# Patient Record
Sex: FEMALE | Race: BLACK OR AFRICAN AMERICAN | NOT HISPANIC OR LATINO
[De-identification: names, ages, dates, MRNs, and addresses within clinical notes are randomized per-mention and may not be internally consistent; named-entity substitution may affect disease eponyms.]

---

## 2023-06-08 PROBLEM — Z00.00 ENCOUNTER FOR PREVENTIVE HEALTH EXAMINATION: Status: ACTIVE | Noted: 2023-06-08

## 2023-06-20 ENCOUNTER — APPOINTMENT (OUTPATIENT)
Dept: SURGERY | Facility: CLINIC | Age: 33
End: 2023-06-20
Payer: MEDICARE

## 2023-06-20 ENCOUNTER — LABORATORY RESULT (OUTPATIENT)
Age: 33
End: 2023-06-20

## 2023-06-20 VITALS
DIASTOLIC BLOOD PRESSURE: 91 MMHG | SYSTOLIC BLOOD PRESSURE: 168 MMHG | HEART RATE: 109 BPM | BODY MASS INDEX: 50.02 KG/M2 | WEIGHT: 293 LBS | HEIGHT: 64 IN

## 2023-06-20 DIAGNOSIS — G47.30 SLEEP APNEA, UNSPECIFIED: ICD-10-CM

## 2023-06-20 DIAGNOSIS — M54.9 DORSALGIA, UNSPECIFIED: ICD-10-CM

## 2023-06-20 DIAGNOSIS — Z83.3 FAMILY HISTORY OF DIABETES MELLITUS: ICD-10-CM

## 2023-06-20 DIAGNOSIS — Z87.891 PERSONAL HISTORY OF NICOTINE DEPENDENCE: ICD-10-CM

## 2023-06-20 PROCEDURE — 99204 OFFICE O/P NEW MOD 45 MIN: CPT

## 2023-06-20 RX ORDER — IRON/IRON ASP GLY/FA/MV-MIN 38 125-25-1MG
TABLET ORAL
Refills: 0 | Status: ACTIVE | COMMUNITY

## 2023-06-20 RX ORDER — ARIPIPRAZOLE 9.75 MG/1.3ML
INJECTION, SOLUTION INTRAMUSCULAR
Refills: 0 | Status: ACTIVE | COMMUNITY

## 2023-06-22 ENCOUNTER — NON-APPOINTMENT (OUTPATIENT)
Age: 33
End: 2023-06-22

## 2023-06-22 LAB
25(OH)D3 SERPL-MCNC: 18.2 NG/ML
ALBUMIN SERPL ELPH-MCNC: 4 G/DL
ALP BLD-CCNC: 92 U/L
ALT SERPL-CCNC: 15 U/L
ANION GAP SERPL CALC-SCNC: 12 MMOL/L
APTT BLD: 26.9 SEC
AST SERPL-CCNC: 18 U/L
BILIRUB SERPL-MCNC: 0.5 MG/DL
BUN SERPL-MCNC: 15 MG/DL
CALCIUM SERPL-MCNC: 9.1 MG/DL
CALCIUM SERPL-MCNC: 9.1 MG/DL
CHLORIDE SERPL-SCNC: 101 MMOL/L
CHOLEST SERPL-MCNC: 133 MG/DL
CO2 SERPL-SCNC: 27 MMOL/L
CREAT SERPL-MCNC: 0.75 MG/DL
EGFR: 108 ML/MIN/1.73M2
ESTIMATED AVERAGE GLUCOSE: 103 MG/DL
FERRITIN SERPL-MCNC: 8 NG/ML
FOLATE SERPL-MCNC: >20 NG/ML
GLUCOSE SERPL-MCNC: 92 MG/DL
HBA1C MFR BLD HPLC: 5.2 %
HDLC SERPL-MCNC: 41 MG/DL
INR PPP: 1.07 RATIO
IRON SATN MFR SERPL: 43 %
IRON SERPL-MCNC: 186 UG/DL
LDLC SERPL CALC-MCNC: 66 MG/DL
NONHDLC SERPL-MCNC: 92 MG/DL
PARATHYROID HORMONE INTACT: 46 PG/ML
POTASSIUM SERPL-SCNC: 4.3 MMOL/L
PROT SERPL-MCNC: 7 G/DL
PT BLD: 12.6 SEC
SODIUM SERPL-SCNC: 140 MMOL/L
TIBC SERPL-MCNC: 436 UG/DL
TRIGL SERPL-MCNC: 127 MG/DL
TSH SERPL-ACNC: 2.08 UIU/ML
UIBC SERPL-MCNC: 250 UG/DL
VIT B12 SERPL-MCNC: 866 PG/ML

## 2023-06-22 NOTE — HISTORY OF PRESENT ILLNESS
[de-identified] : LORETTA CHAN is a 33 year old female who present in the office with morbid obesity (BMI  80.16 kg/m2)  for bariatric surgery consultation. Patient was referred by Dr Edward. The patient has tried multiple methods to lose weight in the past including diets, Calorie-counting, restricted intake, portion control and exercise without any long term success. The patient has been obese for many years.  Patient feels that weight loss surgery is the only option at this point for effective and clinically meaningful weight loss. Patient seek weight loss surgery for improvement of their activity level, health and comorbid conditions. Patient was advised on she should not get pregnant during this process and 2 years after the surgery to increase weigh loss goal. \par \par Her PMHX includes HTN, Bipolar disorder with last hospitalization in 2019, Sleep apnea( stated that CPAP was not approved by her insurance), Back pain. Patient denies Acid Reflux.  Her profession is disability.

## 2023-06-22 NOTE — CONSULT LETTER
[Dear  ___] : Dear  [unfilled], [Consult Letter:] : I had the pleasure of evaluating your patient, [unfilled]. [Consult Closing:] : Thank you very much for allowing me to participate in the care of this patient.  If you have any questions, please do not hesitate to contact me. [Sincerely,] : Sincerely, [DrDevan  ___] : Dr. JENKINS [___] : [unfilled] [FreeTextEntry1] : [place] [address], [facility]  [phone] [FreeTextEntry3] : Dr Lozano

## 2023-06-22 NOTE — ASSESSMENT
[FreeTextEntry1] : LORETTA CHAN is a 33 year old female with morbid obesity with BMI of 80.16 kg/m2 which places patient in the morbidly obese category. This has directly contributed to patient's current medical conditions as well as, a decreased quality of life. Patient has very little chance of successfully losing and maintaining a significant amount of weight with non-surgical management, and would benefit from surgical intervention. Patient meets the criteria for weight loss surgery as defined in the NIH consensus statement, surgery is medically necessary. \par \par Given patient’s current BMI and obesity-related comorbidities, Ms. CHAN  meets the NIH criteria for bariatric surgery and  I feel the patient is a candidate for and would benefit from weight loss surgery, as all prior attempts by non-surgical means have been futile. \par \par I have had a lengthy conversation with the patient and have discussed my impression and treatment plan with the patient. \par The risks, benefits and alternatives, including laparoscopic gastric bypass, and laparoscopic sleeve gastrectomy, ALONSO-S were discussed at length and all of his questions were answered. The patient appears to understand and wishes to proceed.\par \par The patient was given the following instructions:\par \par \par 1) Standard preoperative bariatric workup to include cardiology and pulmonology clearance, upper endoscopy with biopsy for H pylori, and preoperative fasting blood work.\par 2) Patient will need to meet with psychologist and nutritionist for preoperative counseling regarding dietary goals, lifestyle changes, and postoperative expectations\par 3) We will assess the need for a medically-supervised diet, if required by the patient's insurer\par 4) Patient must attend 1 information session with bariatric coordinator and team\par 5) Patient should call insurance to ensure coverage for bariatric surgery\par 6) Bariatric Coordinator \par 7) Follow up next month \par 8) Patient wishes to have her weigh ins done in our office. \par 9) Patient will be on anticoagulation post bariatric surgery for  DVT prophylaxis post surgery.

## 2023-06-22 NOTE — PHYSICAL EXAM
[Obese] : obese [Normal] : affect appropriate [de-identified] : Normoactive bowel sounds, soft and nontender, no hepatosplenomegaly or masses noted\par

## 2023-06-22 NOTE — REASON FOR VISIT
[Initial Consult] : an initial consult for [Morbid Obesity (BMI>40)] : morbid obesity (bmi>40) [FreeTextEntry2] : .

## 2023-06-22 NOTE — PLAN
[FreeTextEntry1] : Please follow up at the office in next month and as needed for any questions or concerns

## 2023-06-22 NOTE — REVIEW OF SYSTEMS
[SOB on Exertion] : shortness of breath during exertion [Negative] : Allergic/Immunologic [Shortness Of Breath] : no shortness of breath [Wheezing] : no wheezing [Cough] : no cough

## 2023-06-23 ENCOUNTER — APPOINTMENT (OUTPATIENT)
Dept: GASTROENTEROLOGY | Facility: CLINIC | Age: 33
End: 2023-06-23
Payer: MEDICARE

## 2023-06-23 VITALS
BODY MASS INDEX: 50.02 KG/M2 | DIASTOLIC BLOOD PRESSURE: 95 MMHG | HEART RATE: 95 BPM | OXYGEN SATURATION: 97 % | TEMPERATURE: 98.1 F | WEIGHT: 293 LBS | HEIGHT: 64 IN | SYSTOLIC BLOOD PRESSURE: 158 MMHG

## 2023-06-23 DIAGNOSIS — R10.13 EPIGASTRIC PAIN: ICD-10-CM

## 2023-06-23 PROCEDURE — 99203 OFFICE O/P NEW LOW 30 MIN: CPT

## 2023-06-23 RX ORDER — SIMETHICONE 125 MG/1
125 TABLET, CHEWABLE ORAL
Qty: 120 | Refills: 3 | Status: ACTIVE | COMMUNITY
Start: 2023-06-23 | End: 1900-01-01

## 2023-06-23 NOTE — HISTORY OF PRESENT ILLNESS
[FreeTextEntry1] : The patient is a 33-year-old female with past medical history significant for hypertension, and obstructive sleep apnea not on C-PAP hypothyroidism and anemia who was referred to my office by Dr. Edward ([phone] [facility]) for dyspepsia, atypical chest pain. The patient also came to the office for evaluation for bariatric surgery with Dr. López Lozano for possible gastric sleeve surgery.  I was asked to render an opinion for consultation for the above complaints.   The patient states that she is feeling fine.  The patient denies any abdominal pain.  The patient complains of abdominal gas and bloating.  The patient denies any nausea or vomiting.  The patient denies any gastroesophageal reflux disease or dysphagia. The patient complains of atypical chest pain, shortness of breath and palpitations.  The chest pain is described as a sharp, pressure, intermittent substernal discomfort that occasionally radiates to the back.  The patient denies any diaphoresis. The chest pain is described as being 6 out of 10 in intensity.  The chest pain can occur at any time.  The chest pain is worse at night.  The chest pain is worse with stress and improves with passing gas.  The chest pain is unrelated to meals.  The chest pain has awakened the patient from sleep.  The patient denies any constipation or diarrhea.  The patient has 1 to 2 bowel movements a day. The patient denies a change in bowel habits.  The patient denies a change in caliber of stool.   The patient denies having mucus discharge with the bowel movements.  The patient denies any bright red blood per rectum, melena or hematemesis.  The patient denies any rectal pain or rectal pruritus. The patient complains of fluctuating weight but denies any anorexia.  The patient weights 467 lbs and 5 foot 4 inches. She denies any fevers or chills.  The patient denies any jaundice or pruritus.  The patient complains of lower back pain.  The blood work performed on 2023 revealed a normal total cholesterol/triglyceride level of 133/127 mg/dL, respectively, and elevated serum iron level of 186 mcg/dL, and elevated TIBC of 436 mcg/dL, a normal U IBC of 250 mcg/dL, a normal iron percent saturation of 43%, normal liver enzymes with a total bilirubin of 0.5 mg/dL, a normal alkaline phosphatase/AST/ALT of 92/18/15 U/L, respectively, a normal intact parathyroid hormone of 46 pg/mL, a normal calcium level of 9.1 mg/dL, a normal vitamin B12 level of 866 pg/mL, a normal folate level >20.0 ng/mL, a low ferritin level of 8 ng/mL, a normal TSH of 2.08u IU/mL, a low vitamin D level of 18.2 ng/mL, a normal hemoglobin A1c of 5.2% with an estimated average glucose of 103 mg/dL, anemia with a hemoglobin/hematocrit level of 8.5/33.1, respectively, a low platelet count of 141,000, a normal PTT/INR of 12.6/1.07/, respectively, a low PTT of 26.9 seconds. The patient denies ever having a prior upper endoscopy and colonoscopy performed by another gastroenterologist.  The patient's last menstrual period was on May 29, 2023. The patient's periods are irregular.  The patient's menstrual periods are heavy for 2 to 3 weeks.  The patient is a .  The patient's first menstrual period was at age 12. The patient denies any significant family history of GI problems.  \par \par (+) prior smoking 2 to 5 cigarettes a day x 2 to 3 years, (-) ETOH, (-) IVDA\par 
No

## 2023-06-23 NOTE — REVIEW OF SYSTEMS
[Feeling Tired] : feeling tired [Chest Pain] : chest pain [Palpitations] : palpitations [SOB on Exertion] : shortness of breath during exertion [Bloating (gassiness)] : bloating [Anxiety] : anxiety [Depression] : depression [Easy Bruising] : a tendency for easy bruising [Negative] : Heme/Lymph [FreeTextEntry4] : ear infection [de-identified] : headaches

## 2023-06-23 NOTE — ASSESSMENT
[FreeTextEntry1] : Dyspepsia: The patient complains of dyspeptic symptoms.  The patient was advised to abide by an anti-gas (low FOD-MAP) diet.  The patient was given a pamphlet for anti-gas (low FOD-MAP).  The patient and I reviewed the anti-gas (low FOD-MAP) diet at length. The patient is to start on a trial of Simethicone one tablet 4 times a day p.r.n. abdominal pain and gas.\par Atypical Chest Pain: The patient complains of atypical chest pain of unclear etiology.  The patient was advised to follow up with the PMD and cardiologist regarding evaluation for the atypical chest pain. The patient was told of possible etiologies such as cardiac, pulmonary, GI, musculoskeletal, stress and other causes for the atypical chest pain.  The patient agrees and will follow-up with the PMD and cardiologist. \par Obesity: The patient is morbidly obese.  The patient was advised to lose weight and exercise.  The patient was advised to followup with a nutritionist regarding dieting for the weight loss. The patient cannot lose weight.  The patient is being evaluated with bariatric surgeon.   The patient is being followed for bariatric surgery for obesity with Dr. López Lozano for possible gastric sleeve surgery.  The patient agrees and will follow-up.  I recommend an upper endoscopy to assess for peptic ulcer disease versus esophagitis and any contraindications for bariatric surgery.  The patient was told of the risks and benefits of the procedure. The patient was told there was perforation, emergency surgery, bleeding, infections and missed lesions. The patient agreed and will schedule for the procedure. The patient was told to be n.p.o. after midnight. The patient is to return to the procedure.\par Upper Endoscopy: I recommend an upper endoscopy to assess for peptic ulcer disease versus esophagitis.  The patient was told of the risks and benefits of the procedure.     The patient is told not to drive, drink alcohol, use recreational drugs, exercise, or work the day of the procedure.  The patient was told of the need for an escort to accompany the patient home after the procedure. The patient is aware that the procedure may be cancelled if they fail to follow the directions.  The patient agreed and will schedule for the procedure. The patient is to be n.p.o. after midnight.  The patient is to return for the procedure.\par Follow-up: The patient is to follow-up in the office in 4 weeks to reassess the symptoms. The patient was told to call the office if any further problems. \par

## 2023-06-26 ENCOUNTER — APPOINTMENT (OUTPATIENT)
Dept: CARDIOLOGY | Facility: CLINIC | Age: 33
End: 2023-06-26
Payer: MEDICARE

## 2023-06-26 ENCOUNTER — NON-APPOINTMENT (OUTPATIENT)
Age: 33
End: 2023-06-26

## 2023-06-26 VITALS
BODY MASS INDEX: 50.02 KG/M2 | WEIGHT: 293 LBS | TEMPERATURE: 97.9 F | HEART RATE: 110 BPM | OXYGEN SATURATION: 93 % | HEIGHT: 64 IN

## 2023-06-26 DIAGNOSIS — I10 ESSENTIAL (PRIMARY) HYPERTENSION: ICD-10-CM

## 2023-06-26 DIAGNOSIS — R07.89 OTHER CHEST PAIN: ICD-10-CM

## 2023-06-26 DIAGNOSIS — R06.09 OTHER FORMS OF DYSPNEA: ICD-10-CM

## 2023-06-26 DIAGNOSIS — Z13.6 ENCOUNTER FOR SCREENING FOR CARDIOVASCULAR DISORDERS: ICD-10-CM

## 2023-06-26 LAB — VIT B1 SERPL-MCNC: 157.2 NMOL/L

## 2023-06-26 PROCEDURE — 99204 OFFICE O/P NEW MOD 45 MIN: CPT

## 2023-06-26 PROCEDURE — 93000 ELECTROCARDIOGRAM COMPLETE: CPT

## 2023-06-26 RX ORDER — LOSARTAN POTASSIUM 25 MG/1
25 TABLET, FILM COATED ORAL DAILY
Qty: 1 | Refills: 3 | Status: ACTIVE | COMMUNITY
Start: 2023-06-26 | End: 1900-01-01

## 2023-06-27 DIAGNOSIS — E55.9 VITAMIN D DEFICIENCY, UNSPECIFIED: ICD-10-CM

## 2023-06-27 RX ORDER — CHOLECALCIFEROL (VITAMIN D3) 1250 MCG
1.25 MG CAPSULE ORAL
Qty: 8 | Refills: 3 | Status: ACTIVE | COMMUNITY
Start: 2023-06-27 | End: 1900-01-01

## 2023-07-07 ENCOUNTER — OUTPATIENT (OUTPATIENT)
Dept: OUTPATIENT SERVICES | Facility: HOSPITAL | Age: 33
LOS: 1 days | End: 2023-07-07
Payer: MEDICARE

## 2023-07-07 DIAGNOSIS — R07.89 OTHER CHEST PAIN: ICD-10-CM

## 2023-07-07 PROCEDURE — 93306 TTE W/DOPPLER COMPLETE: CPT | Mod: 26

## 2023-07-07 PROCEDURE — 93306 TTE W/DOPPLER COMPLETE: CPT

## 2023-07-10 ENCOUNTER — APPOINTMENT (OUTPATIENT)
Dept: PULMONOLOGY | Facility: CLINIC | Age: 33
End: 2023-07-10
Payer: MEDICARE

## 2023-07-10 VITALS — OXYGEN SATURATION: 100 % | WEIGHT: 293 LBS | HEART RATE: 93 BPM | BODY MASS INDEX: 50.02 KG/M2 | HEIGHT: 64 IN

## 2023-07-10 DIAGNOSIS — Z98.84 BARIATRIC SURGERY STATUS: ICD-10-CM

## 2023-07-10 DIAGNOSIS — R06.83 SNORING: ICD-10-CM

## 2023-07-10 DIAGNOSIS — G47.19 OTHER HYPERSOMNIA: ICD-10-CM

## 2023-07-10 PROCEDURE — 99204 OFFICE O/P NEW MOD 45 MIN: CPT

## 2023-07-10 NOTE — REASON FOR VISIT
[Initial] : an initial visit [Shortness of Breath] : shortness of breath [Pre-op Risk Stratification] : pre-op risk stratification

## 2023-07-11 ENCOUNTER — NON-APPOINTMENT (OUTPATIENT)
Age: 33
End: 2023-07-11

## 2023-07-12 NOTE — ASSESSMENT
[FreeTextEntry1] : no clinical evidence of pulmonary pathology, good exercise tolerance. \par PFT and CXR pending\par \par Pt reports significant snoring and daytime sleepiness, and should undergo a diagnostic home sleep study to assess for any evidence of JAVIER. If found to be moderate or severe, would recommend 2-3 weeks of appropriate CPAP treatment prior to any surgical intervention.\par \par

## 2023-07-12 NOTE — CONSULT LETTER
[Dear  ___] : Dear  [unfilled], [Consult Letter:] : I had the pleasure of evaluating your patient, [unfilled]. [Please see my note below.] : Please see my note below. [Consult Closing:] : Thank you very much for allowing me to participate in the care of this patient.  If you have any questions, please do not hesitate to contact me. [Sincerely,] : Sincerely, [FreeTextEntry3] : Supriya Potter MD, FCCP\par Chief, Pulmonary Medicine\par [facility]\par [facility]\par  of Medicine\par [employer] [facility]\par \par

## 2023-07-12 NOTE — HISTORY OF PRESENT ILLNESS
[TextBox_4] : LORETTA CHAN  is a 33 year F who is here for preop evaluation for bariatric surgery. Pt reports no h/o respiratory illnesses, non smoker, without any occupational exposures. Pt reports good exercise tolerance on flat surface for about 3 blocks and some difficulty climbing stairs more than 3 flights. No cough, no chest pain or palpitations. \par Pt reports significant snoring, frequent night awakenings and daytime sleepiness with ESS >10.\par

## 2023-07-14 ENCOUNTER — OUTPATIENT (OUTPATIENT)
Dept: OUTPATIENT SERVICES | Facility: HOSPITAL | Age: 33
LOS: 1 days | End: 2023-07-14
Payer: MEDICARE

## 2023-07-14 ENCOUNTER — RESULT REVIEW (OUTPATIENT)
Age: 33
End: 2023-07-14

## 2023-07-14 ENCOUNTER — NON-APPOINTMENT (OUTPATIENT)
Age: 33
End: 2023-07-14

## 2023-07-14 VITALS
SYSTOLIC BLOOD PRESSURE: 173 MMHG | RESPIRATION RATE: 16 BRPM | WEIGHT: 293 LBS | TEMPERATURE: 98 F | OXYGEN SATURATION: 100 % | HEIGHT: 64 IN | HEART RATE: 97 BPM | DIASTOLIC BLOOD PRESSURE: 109 MMHG

## 2023-07-14 DIAGNOSIS — Z01.818 ENCOUNTER FOR OTHER PREPROCEDURAL EXAMINATION: ICD-10-CM

## 2023-07-14 DIAGNOSIS — Z98.890 OTHER SPECIFIED POSTPROCEDURAL STATES: Chronic | ICD-10-CM

## 2023-07-14 DIAGNOSIS — Z98.84 BARIATRIC SURGERY STATUS: ICD-10-CM

## 2023-07-14 LAB
ALBUMIN SERPL ELPH-MCNC: 3.4 G/DL — LOW (ref 3.5–5)
ALP SERPL-CCNC: 83 U/L — SIGNIFICANT CHANGE UP (ref 40–120)
ALT FLD-CCNC: 24 U/L DA — SIGNIFICANT CHANGE UP (ref 10–60)
ANION GAP SERPL CALC-SCNC: 6 MMOL/L — SIGNIFICANT CHANGE UP (ref 5–17)
ANISOCYTOSIS BLD QL: SLIGHT — SIGNIFICANT CHANGE UP
APTT BLD: 30.4 SEC — SIGNIFICANT CHANGE UP (ref 27.5–35.5)
AST SERPL-CCNC: 10 U/L — SIGNIFICANT CHANGE UP (ref 10–40)
BASOPHILS # BLD AUTO: 0.02 K/UL — SIGNIFICANT CHANGE UP (ref 0–0.2)
BASOPHILS NFR BLD AUTO: 0.3 % — SIGNIFICANT CHANGE UP (ref 0–2)
BILIRUB SERPL-MCNC: 0.4 MG/DL — SIGNIFICANT CHANGE UP (ref 0.2–1.2)
BLD GP AB SCN SERPL QL: SIGNIFICANT CHANGE UP
BUN SERPL-MCNC: 13 MG/DL — SIGNIFICANT CHANGE UP (ref 7–18)
CALCIUM SERPL-MCNC: 8.9 MG/DL — SIGNIFICANT CHANGE UP (ref 8.4–10.5)
CHLORIDE SERPL-SCNC: 104 MMOL/L — SIGNIFICANT CHANGE UP (ref 96–108)
CO2 SERPL-SCNC: 30 MMOL/L — SIGNIFICANT CHANGE UP (ref 22–31)
CREAT SERPL-MCNC: 0.69 MG/DL — SIGNIFICANT CHANGE UP (ref 0.5–1.3)
DACRYOCYTES BLD QL SMEAR: SLIGHT — SIGNIFICANT CHANGE UP
EGFR: 117 ML/MIN/1.73M2 — SIGNIFICANT CHANGE UP
EOSINOPHIL # BLD AUTO: 0.11 K/UL — SIGNIFICANT CHANGE UP (ref 0–0.5)
EOSINOPHIL NFR BLD AUTO: 1.6 % — SIGNIFICANT CHANGE UP (ref 0–6)
GLUCOSE SERPL-MCNC: 78 MG/DL — SIGNIFICANT CHANGE UP (ref 70–99)
HCG SERPL-ACNC: <1 MIU/ML — SIGNIFICANT CHANGE UP
HCT VFR BLD CALC: 35.8 % — SIGNIFICANT CHANGE UP (ref 34.5–45)
HGB BLD-MCNC: 9.8 G/DL — LOW (ref 11.5–15.5)
HYPOCHROMIA BLD QL: SIGNIFICANT CHANGE UP
IMM GRANULOCYTES NFR BLD AUTO: 0.3 % — SIGNIFICANT CHANGE UP (ref 0–0.9)
INR BLD: 1.11 RATIO — SIGNIFICANT CHANGE UP (ref 0.88–1.16)
LG PLATELETS BLD QL AUTO: SLIGHT — SIGNIFICANT CHANGE UP
LYMPHOCYTES # BLD AUTO: 2.07 K/UL — SIGNIFICANT CHANGE UP (ref 1–3.3)
LYMPHOCYTES # BLD AUTO: 29.2 % — SIGNIFICANT CHANGE UP (ref 13–44)
MANUAL SMEAR VERIFICATION: SIGNIFICANT CHANGE UP
MCHC RBC-ENTMCNC: 20.1 PG — LOW (ref 27–34)
MCHC RBC-ENTMCNC: 27.4 GM/DL — LOW (ref 32–36)
MCV RBC AUTO: 73.5 FL — LOW (ref 80–100)
MONOCYTES # BLD AUTO: 0.43 K/UL — SIGNIFICANT CHANGE UP (ref 0–0.9)
MONOCYTES NFR BLD AUTO: 6.1 % — SIGNIFICANT CHANGE UP (ref 2–14)
NEUTROPHILS # BLD AUTO: 4.44 K/UL — SIGNIFICANT CHANGE UP (ref 1.8–7.4)
NEUTROPHILS NFR BLD AUTO: 62.5 % — SIGNIFICANT CHANGE UP (ref 43–77)
NRBC # BLD: 0 /100 WBCS — SIGNIFICANT CHANGE UP (ref 0–0)
OVALOCYTES BLD QL SMEAR: SLIGHT — SIGNIFICANT CHANGE UP
PLAT MORPH BLD: NORMAL — SIGNIFICANT CHANGE UP
PLATELET # BLD AUTO: 187 K/UL — SIGNIFICANT CHANGE UP (ref 150–400)
PLATELET COUNT - ESTIMATE: NORMAL — SIGNIFICANT CHANGE UP
POIKILOCYTOSIS BLD QL AUTO: SLIGHT — SIGNIFICANT CHANGE UP
POTASSIUM SERPL-MCNC: 4.2 MMOL/L — SIGNIFICANT CHANGE UP (ref 3.5–5.3)
POTASSIUM SERPL-SCNC: 4.2 MMOL/L — SIGNIFICANT CHANGE UP (ref 3.5–5.3)
PROT SERPL-MCNC: 7.5 G/DL — SIGNIFICANT CHANGE UP (ref 6–8.3)
PROTHROM AB SERPL-ACNC: 13.2 SEC — SIGNIFICANT CHANGE UP (ref 10.5–13.4)
RBC # BLD: 4.87 M/UL — SIGNIFICANT CHANGE UP (ref 3.8–5.2)
RBC # FLD: 28.1 % — HIGH (ref 10.3–14.5)
RBC BLD AUTO: ABNORMAL
SCHISTOCYTES BLD QL AUTO: SLIGHT — SIGNIFICANT CHANGE UP
SODIUM SERPL-SCNC: 140 MMOL/L — SIGNIFICANT CHANGE UP (ref 135–145)
STOMATOCYTES BLD QL SMEAR: SLIGHT — SIGNIFICANT CHANGE UP
WBC # BLD: 7.09 K/UL — SIGNIFICANT CHANGE UP (ref 3.8–10.5)
WBC # FLD AUTO: 7.09 K/UL — SIGNIFICANT CHANGE UP (ref 3.8–10.5)

## 2023-07-14 PROCEDURE — G0463: CPT

## 2023-07-14 PROCEDURE — 71046 X-RAY EXAM CHEST 2 VIEWS: CPT

## 2023-07-14 PROCEDURE — 71046 X-RAY EXAM CHEST 2 VIEWS: CPT | Mod: 26

## 2023-07-14 RX ORDER — LISINOPRIL 2.5 MG/1
1 TABLET ORAL
Refills: 0 | DISCHARGE

## 2023-07-14 RX ORDER — ARIPIPRAZOLE 15 MG/1
0 TABLET ORAL
Refills: 0 | DISCHARGE

## 2023-07-14 NOTE — H&P PST ADULT - NSICDXPASTMEDICALHX_GEN_ALL_CORE_FT
PAST MEDICAL HISTORY:  Atypical chest pain     Bipolar disorder     Exertional dyspnea     Hypertension     Iron deficiency anemia     Morbid obesity     Right arm fracture     Snoring

## 2023-07-14 NOTE — H&P PST ADULT - NSICDXFAMILYHX_GEN_ALL_CORE_FT
FAMILY HISTORY:  Father  Still living? No  FH: type 2 diabetes, Age at diagnosis: Age Unknown  FHx: hypertension, Age at diagnosis: Age Unknown    Mother  Still living? Unknown  FH: type 2 diabetes, Age at diagnosis: Age Unknown

## 2023-07-14 NOTE — H&P PST ADULT - PROBLEM SELECTOR PLAN 4
Patient has uncontrolled hypertension, 2 years ago patient was taking Norvasc but stopped since it was making her drowsy . Patient currently is on Lisinopril 10 daily, pt said hat Cardiology started her on Losartan 25 mg daily but changed to lisinopril on day of PST. Patient denies blurry vision, headache, dizziness, BP still elevated, may need to adjust dose by Cardiology or adding additional medication. Tested Dr. Ngo about patient preparation for surgery and Cardiologist confirmed verbally that patient is not optimized yet at this point,.Still needs coronary CTA. Will remind Cardiology to address elevated BP. Surgery will be scheduled after patient will get cardiac optimization for EGD.

## 2023-07-14 NOTE — H&P PST ADULT - RESPIRATORY
normal/no wheezes/no rales/no rhonchi/no respiratory distress/no use of accessory muscles/airway patent/breath sounds equal

## 2023-07-14 NOTE — H&P PST ADULT - PROBLEM SELECTOR PLAN 2
Patient reports at PST dyspnea on exertion after walking 1 block and also having atypical chest pains - both problems addressed already by Cardiology and Pulmonology. Chest xray clear, pt is waiting for PFTs, and coronary CTA - not optimized for 7/18 surgery postponed

## 2023-07-14 NOTE — H&P PST ADULT - NEUROLOGICAL
details… normal/cranial nerves II-XII intact/sensation intact/responds to pain/responds to verbal commands/deep reflexes intact

## 2023-07-14 NOTE — H&P PST ADULT - PROBLEM SELECTOR PLAN 8
Patient reports snoring, frequent night awakenings and daytime sleepiness. Patient has high risk for severe JAVIER, points calculated from STOP bang scale are giving score 6 t this is risk for severe JAVIER- can not say 100 percent patient is diagnosed with severe JAVIER since patient did not have sleep studies yet. . Patient  had initial visit at clinic with Pulmonology Tariq Epps for evaluation before bariatric surgery, pt is to make appointment for sleep studies in sleep center. Patient reports snoring, frequent night awakenings and daytime sleepiness. Chest xray done at [facility] shows clear lungs and pt has Mallampati II. Anesthesia and OR booking will be aware about patient having risk for severe JAVIER

## 2023-07-14 NOTE — H&P PST ADULT - PROBLEM SELECTOR PLAN 7
Patient is taking monthly injection of Abilify , bipolar disorder controlled with medications by her psychiatrists , last dose 6/25. Patient calm and pleasant at office

## 2023-07-14 NOTE — H&P PST ADULT - PATIENT OPTIMIZED PENDING
CTA cardiac cardiac optimization pending cardiac CTA and better control of BP, possible may need PFTs before EGD and Pulmonary optimization before EGD- Anesthesia discretion

## 2023-07-14 NOTE — H&P PST ADULT - PROBLEM SELECTOR PLAN 6
Patient instructed to take prescribed iron , but do not take it in the morning of surgery. Patient to follow up with Hematology (have hematology evaluation/ clearance before final bariatric surgery)

## 2023-07-14 NOTE — H&P PST ADULT - HISTORY OF PRESENT ILLNESS
Patient with pMH of morbid obesity with BMI - 79 and uncontrolled hypertension , snoring, iron deficiency anemia, atypical chest pain , dyspnea on exertion after walking 1 block and with chest pain after, audible loud breathing on exertion, STOP bang calculated score of 6 so presumable severe JAVIER( not diagnosed yet since patient did bot have sleep studies yet  - just started process of preparation for bariatric surgery ), , pmh of bipolar disorder on monthly Abilify injections, hx of right arm fracture and s/p ORIF of right arm ( as per patient no hardware)  is diagnosed with bariatric surgery status and is scheduled for esophagogastroduodenoscopy on 7/18/2023.    Patient is 33 years old female with pMH of morbid obesity with BMI - 79 and uncontrolled hypertension , snoring, iron deficiency anemia, atypical chest pain , dyspnea on exertion after walking 1 block and with chest pain after, audible loud breathing on exertion, STOP bang calculated score of 6 so presumable severe JAVIER( not diagnosed yet since patient did bot have sleep studies yet  - just started process of preparation for bariatric surgery ), , pmh of bipolar disorder on monthly Abilify injections, hx of right arm fracture and s/p ORIF of right arm ( as per patient no hardware)  is diagnosed with bariatric surgery status and is scheduled for esophagogastroduodenoscopy on 7/18/2023.

## 2023-07-14 NOTE — H&P PST ADULT - PRIMARY CARE PROVIDER
[facility] , DR. Oneal [facility] [phone], Cardiology Dr. Hugh Ngo - [address] [facility], Pulmonology DR. Potter [phone] [facility]

## 2023-07-14 NOTE — H&P PST ADULT - PROBLEM SELECTOR PLAN 3
Patient reports at [facility] dyspnea on exertion after walking 1 block and also having atypical chest pains - both problems addressed already by Cardiology and Pulmonology. Chest xray clear, pt is waiting for PFTs, and coronary CTA - not optimized for 7/18 surgery postponed  EKG done at cardiology office NSR, normal axis, normal intervals, no cardiac symptoms at [facility] , echo was done 7/7  Echo conclusion    1. Normal mitral valve. Trace mitral regurgitation.  2. Aortic valve not well visualized; probably normal. No  aortic valve regurgitation seen.  3. Normal left ventricular internal dimensions and wall  thicknesses.  4. Endocardium not well visualized; grossly normal left  ventricular systolic function. LVEF 55-60%.  5. Normal diastolic function.  6. Normal right ventricular size and function

## 2023-07-14 NOTE — H&P PST ADULT - NSANTHOSAYNRD_GEN_A_CORE
in process to make appointment for sleep studies/No. JAVIER screening performed.  STOP BANG Legend: 0-2 = LOW Risk; 3-4 = INTERMEDIATE Risk; 5-8 = HIGH Risk in process to make appointment for sleep studies with Pulmonology, however scoring gives 6 on STOP BANG scale and high risk for sleep apnea/No. JAVIER screening performed.  STOP BANG Legend: 0-2 = LOW Risk; 3-4 = INTERMEDIATE Risk; 5-8 = HIGH Risk

## 2023-07-14 NOTE — H&P PST ADULT - PATIENT OPTIMIZED
to verify with Anesthesia if they also need Pulmonology optimization before EGD to verify with Anesthesia if they also need Pulmonology optimization before EGD, surgery already postponed at least 2 weeks , Surgeon Dr Villanueva aware and patient is aware and in agreement to have EGD after being cleared by Cardiologist for surgery

## 2023-07-14 NOTE — H&P PST ADULT - LAST STRESS TEST
unable to be done patient to have coronary CTA to assess coronary anatomy (her current weight exceeds ability for her to obtain an exercise treadmill stress test).

## 2023-07-15 DIAGNOSIS — Z98.84 BARIATRIC SURGERY STATUS: ICD-10-CM

## 2023-07-15 RX ORDER — FERROUS FUMARATE 350(115)MG
1 TABLET ORAL
Refills: 0 | DISCHARGE

## 2023-07-15 RX ORDER — FERROUS SULFATE 325(65) MG
1 TABLET ORAL
Refills: 0 | DISCHARGE

## 2023-07-15 RX ORDER — LEVONORGESTREL 1.5 MG/1
1 TABLET ORAL
Refills: 0 | DISCHARGE

## 2023-07-16 DIAGNOSIS — E66.01 MORBID (SEVERE) OBESITY DUE TO EXCESS CALORIES: ICD-10-CM

## 2023-07-16 DIAGNOSIS — G47.33 OBSTRUCTIVE SLEEP APNEA (ADULT) (PEDIATRIC): ICD-10-CM

## 2023-07-16 DIAGNOSIS — R07.89 OTHER CHEST PAIN: ICD-10-CM

## 2023-07-16 DIAGNOSIS — R06.09 OTHER FORMS OF DYSPNEA: ICD-10-CM

## 2023-07-16 DIAGNOSIS — F31.9 BIPOLAR DISORDER, UNSPECIFIED: ICD-10-CM

## 2023-07-16 DIAGNOSIS — I10 ESSENTIAL (PRIMARY) HYPERTENSION: ICD-10-CM

## 2023-07-16 DIAGNOSIS — D50.9 IRON DEFICIENCY ANEMIA, UNSPECIFIED: ICD-10-CM

## 2023-07-25 ENCOUNTER — APPOINTMENT (OUTPATIENT)
Dept: SURGERY | Facility: CLINIC | Age: 33
End: 2023-07-25
Payer: MEDICARE

## 2023-07-25 VITALS
BODY MASS INDEX: 50.02 KG/M2 | DIASTOLIC BLOOD PRESSURE: 76 MMHG | HEIGHT: 64 IN | HEART RATE: 131 BPM | SYSTOLIC BLOOD PRESSURE: 165 MMHG | WEIGHT: 293 LBS | OXYGEN SATURATION: 100 %

## 2023-07-25 DIAGNOSIS — E66.01 MORBID (SEVERE) OBESITY DUE TO EXCESS CALORIES: ICD-10-CM

## 2023-07-25 PROBLEM — R06.09 OTHER FORMS OF DYSPNEA: Chronic | Status: ACTIVE | Noted: 2023-07-14

## 2023-07-25 PROBLEM — R06.83 SNORING: Chronic | Status: ACTIVE | Noted: 2023-07-14

## 2023-07-25 PROBLEM — S42.301A UNSPECIFIED FRACTURE OF SHAFT OF HUMERUS, RIGHT ARM, INITIAL ENCOUNTER FOR CLOSED FRACTURE: Chronic | Status: ACTIVE | Noted: 2023-07-14

## 2023-07-25 PROBLEM — I10 ESSENTIAL (PRIMARY) HYPERTENSION: Chronic | Status: ACTIVE | Noted: 2023-07-14

## 2023-07-25 PROBLEM — F31.9 BIPOLAR DISORDER, UNSPECIFIED: Chronic | Status: ACTIVE | Noted: 2023-07-14

## 2023-07-25 PROBLEM — R07.89 OTHER CHEST PAIN: Chronic | Status: ACTIVE | Noted: 2023-07-14

## 2023-07-25 PROBLEM — D50.9 IRON DEFICIENCY ANEMIA, UNSPECIFIED: Chronic | Status: ACTIVE | Noted: 2023-07-14

## 2023-07-25 PROCEDURE — 99213 OFFICE O/P EST LOW 20 MIN: CPT

## 2023-07-25 NOTE — HISTORY OF PRESENT ILLNESS
[de-identified] : LORETTA CHAN is a 33 year old female with morbid obesity (BMI 80.16 kg/m2) who present in the office for pre-operative follow-up and weight management program in preparation for bariatric surgery. Patient seeing by following specialties. \par \par - Gastroenterology pending EGD \par - Pulmonary pending Sleep study. \par - Cardiology- Echo done, pending  CT coronary \par - Psychology- pending clearance\par - Registered Dietitian pending initial  visit

## 2023-07-25 NOTE — REASON FOR VISIT
[Follow-Up Visit] : a follow-up visit for [Morbid Obesity (BMI>40)] : morbid obesity (bmi>40) [FreeTextEntry2] : For Bariatric Surgery

## 2023-07-25 NOTE — ASSESSMENT
[FreeTextEntry1] : LORETTA CHAN is a 33 year old female with morbid obesity (BMI 80.16 kg/m2) who present in the office for pre-operative follow-up and weight management program in preparation for bariatric surgery. \par \par \par Today's weight is 467 lb and BMI 80.16 kg/m2. \par \par Today is second month of weight ins \par \par Patient will  see Registered Dietitian today\par \par Patient doing well, denies any discomfort\par \par RTO next month for weight ins

## 2023-07-25 NOTE — PHYSICAL EXAM
[Obese] : obese [Normal] : affect appropriate [de-identified] : Normoactive bowel sounds, soft and nontender, no hepatosplenomegaly or masses noted\par

## 2023-08-22 ENCOUNTER — APPOINTMENT (OUTPATIENT)
Dept: SURGERY | Facility: CLINIC | Age: 33
End: 2023-08-22

## 2023-09-05 ENCOUNTER — APPOINTMENT (OUTPATIENT)
Dept: PULMONOLOGY | Facility: CLINIC | Age: 33
End: 2023-09-05